# Patient Record
Sex: FEMALE | Race: OTHER | HISPANIC OR LATINO
[De-identification: names, ages, dates, MRNs, and addresses within clinical notes are randomized per-mention and may not be internally consistent; named-entity substitution may affect disease eponyms.]

---

## 2022-11-11 PROBLEM — Z00.00 ENCOUNTER FOR PREVENTIVE HEALTH EXAMINATION: Status: ACTIVE | Noted: 2022-11-11

## 2022-11-18 ENCOUNTER — APPOINTMENT (OUTPATIENT)
Dept: PAIN MANAGEMENT | Facility: CLINIC | Age: 57
End: 2022-11-18

## 2022-11-18 DIAGNOSIS — Z87.39 PERSONAL HISTORY OF OTHER DISEASES OF THE MUSCULOSKELETAL SYSTEM AND CONNECTIVE TISSUE: ICD-10-CM

## 2022-11-18 PROCEDURE — 99204 OFFICE O/P NEW MOD 45 MIN: CPT

## 2022-11-18 RX ORDER — NAPROXEN 500 MG/1
500 TABLET ORAL
Qty: 60 | Refills: 0 | Status: ACTIVE | COMMUNITY
Start: 2022-08-02

## 2022-11-18 RX ORDER — OMEPRAZOLE 20 MG/1
20 CAPSULE, DELAYED RELEASE ORAL
Qty: 90 | Refills: 0 | Status: ACTIVE | COMMUNITY
Start: 2022-10-27

## 2022-11-18 NOTE — DATA REVIEWED
[FreeTextEntry1] : MRI Lumbar Spine:\par L5-S1: grade one spondylolisthesis, severe facet arthropathy, severe bilateral foraminal narrowing, worse on the left, no canal stenosis.

## 2022-11-18 NOTE — PHYSICAL EXAM

## 2022-11-18 NOTE — ASSESSMENT
[FreeTextEntry1] : 56 yof w/ severe low back and bilateral leg pain.\par \par I have personally reviewed the patient's MRI in detail and discussed it with them which is significant for grade one spondylolisthesis at L5-S1 without canal stenosis.\par \par The patient has failed to have relief with medication management. The patient has failed to have relief with more then six weeks of physical therapy within the last three months. Given the patients failure to improve with all other conservative measures, recommend L5-S1 interlaminar epidural steroid injection under fluoroscopic guidance. The patient will follow-up with me in my office two weeks following intervention.\par \par I have discussed in detail with the patient that an interventional spine procedure is associated with potential risks. The procedure may include an injection of steroid and potentially other medications (local anesthetic and normal saline) into the epidural space or surrounding tissue of the spine. There are significant risks of this procedure which include and are not limited to infection, bleeding, worsening pain, dural puncture leading to post-dural puncture headache, nerve damage, spinal cord injury, paralysis, stroke, and death. There is a chance that the procedure does not improve their pain. There are risks associated with the steroid being absorbed into the body systemically. These include dysphoria, difficulty sleeping, mood swings, and personality changes. Pre-menopausal women may notice a regularity his in her menstrual cycle for 2-3 months following the injection. Steroids can specifically affect patients with hypertension, diabetes, and peptic ulcers. The procedure may cause a temporary increase in blood pressure and blood glucose, and may adversely affect a peptic ulcer. Other, more rare complications, including avascular necrosis of the joints, glaucoma, and osteoporosis. I have discussed the risks of the procedure at length with the patient, and the potential benefits of pain relief. I have offered alternatives to the procedure. All questions were answered. The patient expressed understanding and wishes to proceed with the procedure.\par \par Physical therapy prescribed - goal will be to increase ROM, strengthening, postural training, other modalities ad cari which may include massage and stim. Goals of therapy discussed with the patient in detail and will be discussed with physical therapist. Patient will follow-up following course of physical therapy to monitor progress and adjust therapy as needed.\par \par Acetaminophen 1,000 mg q8h prn for moderate pain. Risks, benefits, and alternatives of acetaminophen discussed with patient.\par \par Ibuprofen 600 mg q8h prn add when pain is not adequately controlled with acetaminophen. Risks, benefits, and alternatives of ibuprofen discussed with patient.\par \par Diet and nutritional strategies discussed which may improve patients pain and will improve overall health.\par

## 2022-11-18 NOTE — CONSULT LETTER
[Dear  ___] : Dear  [unfilled], [Consult Letter:] : I had the pleasure of evaluating your patient, [unfilled]. [Please see my note below.] : Please see my note below. [Consult Closing:] : Thank you very much for allowing me to participate in the care of this patient.  If you have any questions, please do not hesitate to contact me. [Sincerely,] : Sincerely, [FreeTextEntry3] : Cortes Cummings MD\par

## 2022-11-18 NOTE — HISTORY OF PRESENT ILLNESS
[Back Pain] : back pain [7] : a current pain level of 7/10 [10] : a maximum pain level of 10/10 [Shooting] : shooting [Electric] : electric [FreeTextEntry1] : 56 yof presents w/ severe low back and bilateral leg pain. Quality of life is impaired. There has been a severe exacerbation of the patient's chronic pain. She has had this pain for years which is worsening. No other recent changes in health.She has seen a spine surgeon who advised surgery previously or injections.

## 2022-11-29 ENCOUNTER — APPOINTMENT (OUTPATIENT)
Dept: PAIN MANAGEMENT | Facility: HOSPITAL | Age: 57
End: 2022-11-29

## 2022-12-20 ENCOUNTER — APPOINTMENT (OUTPATIENT)
Dept: PAIN MANAGEMENT | Facility: HOSPITAL | Age: 57
End: 2022-12-20

## 2022-12-20 ENCOUNTER — TRANSCRIPTION ENCOUNTER (OUTPATIENT)
Age: 57
End: 2022-12-20

## 2023-01-19 ENCOUNTER — APPOINTMENT (OUTPATIENT)
Dept: PAIN MANAGEMENT | Facility: CLINIC | Age: 58
End: 2023-01-19
Payer: MEDICARE

## 2023-01-19 VITALS
BODY MASS INDEX: 29.23 KG/M2 | DIASTOLIC BLOOD PRESSURE: 80 MMHG | HEIGHT: 63 IN | SYSTOLIC BLOOD PRESSURE: 120 MMHG | WEIGHT: 165 LBS

## 2023-01-19 PROCEDURE — 99214 OFFICE O/P EST MOD 30 MIN: CPT

## 2023-01-19 RX ORDER — TERBINAFINE HYDROCHLORIDE 250 MG/1
250 TABLET ORAL
Qty: 30 | Refills: 0 | Status: DISCONTINUED | COMMUNITY
Start: 2022-10-10 | End: 2023-01-19

## 2023-01-19 NOTE — HISTORY OF PRESENT ILLNESS
[Back Pain] : back pain [7] : a current pain level of 7/10 [10] : a maximum pain level of 10/10 [Shooting] : shooting [Electric] : electric [FreeTextEntry1] : Interval History:\par s/p L5-S1 interlaminar epidural steroid on 12/20/22. Improvement in pain. Notes heaviness to right leg and lower back pain. In addition c/o knee and wrist pain. Recently started on Lyrica 75mg and Diclofenac. Feeling much better. Complaining of carpal tunnel syndrome in her right wrist. Quality of life is impaired. There has been a severe exacerbation of the patient's chronic pain.\par \par HPI: 56 yof presents w/ severe low back and bilateral leg pain. Quality of life is impaired. There has been a severe exacerbation of the patient's chronic pain. She has had this pain for years which is worsening. No other recent changes in health.She has seen a spine surgeon who advised surgery previously or injections.\par \par Interventions:\par L5-S1 interlaminar EFFIE (12/20/22):

## 2023-01-19 NOTE — PHYSICAL EXAM

## 2023-01-19 NOTE — ASSESSMENT
[FreeTextEntry1] : 57 yof w/ severe low back and bilateral leg pain w/ improvement s/p EFFIE but still with some pain.\par \par I have personally reviewed the patient's MRI in detail and discussed it with them which is significant for grade one spondylolisthesis at L5-S1 without canal stenosis.\par \par Physical therapy prescribed - goal will be to increase ROM, strengthening, postural training, other modalities ad cari which may include massage and stim. Goals of therapy discussed with the patient in detail and will be discussed with physical therapist. Patient will follow-up following course of physical therapy to monitor progress and adjust therapy as needed.\par \par Acetaminophen 1,000 mg q8h prn for moderate pain. Risks, benefits, and alternatives of acetaminophen discussed with patient.\par \par Ibuprofen 600 mg q8h prn add when pain is not adequately controlled with acetaminophen. Risks, benefits, and alternatives of ibuprofen discussed with patient.\par \par Diet and nutritional strategies discussed which may improve patients pain and will improve overall health.\par \par Consider carpal tunnel injection if pain does not improve.\par

## 2023-02-14 ENCOUNTER — APPOINTMENT (OUTPATIENT)
Dept: PAIN MANAGEMENT | Facility: CLINIC | Age: 58
End: 2023-02-14
Payer: MEDICARE

## 2023-02-14 VITALS
BODY MASS INDEX: 27.29 KG/M2 | WEIGHT: 154 LBS | HEIGHT: 63 IN | DIASTOLIC BLOOD PRESSURE: 80 MMHG | SYSTOLIC BLOOD PRESSURE: 115 MMHG | OXYGEN SATURATION: 95 % | HEART RATE: 69 BPM

## 2023-02-14 PROCEDURE — 99214 OFFICE O/P EST MOD 30 MIN: CPT

## 2023-02-14 RX ORDER — ATORVASTATIN CALCIUM 40 MG/1
40 TABLET, FILM COATED ORAL
Qty: 90 | Refills: 0 | Status: DISCONTINUED | COMMUNITY
Start: 2022-10-27 | End: 2023-02-14

## 2023-02-14 RX ORDER — ICOSAPENT ETHYL 1000 MG/1
1 CAPSULE ORAL
Qty: 360 | Refills: 0 | Status: DISCONTINUED | COMMUNITY
Start: 2022-10-27 | End: 2023-02-14

## 2023-02-14 RX ORDER — AMLODIPINE BESYLATE 10 MG/1
10 TABLET ORAL
Qty: 90 | Refills: 0 | Status: DISCONTINUED | COMMUNITY
Start: 2022-10-27 | End: 2023-02-14

## 2023-02-14 RX ORDER — ALBUTEROL SULFATE 90 UG/1
108 (90 BASE) INHALANT RESPIRATORY (INHALATION)
Qty: 18 | Refills: 0 | Status: DISCONTINUED | COMMUNITY
Start: 2022-11-08 | End: 2023-02-14

## 2023-02-14 RX ORDER — AZITHROMYCIN 500 MG/1
500 TABLET, FILM COATED ORAL
Qty: 5 | Refills: 0 | Status: DISCONTINUED | COMMUNITY
Start: 2022-10-13 | End: 2023-02-14

## 2023-02-14 RX ORDER — BUDESONIDE, GLYCOPYRROLATE, AND FORMOTEROL FUMARATE 160; 9; 4.8 UG/1; UG/1; UG/1
160-9-4.8 AEROSOL, METERED RESPIRATORY (INHALATION)
Qty: 11 | Refills: 0 | Status: DISCONTINUED | COMMUNITY
Start: 2022-11-08 | End: 2023-02-14

## 2023-02-14 RX ORDER — PREGABALIN 75 MG/1
75 CAPSULE ORAL
Refills: 0 | Status: DISCONTINUED | COMMUNITY
End: 2023-02-14

## 2023-02-14 RX ORDER — DICLOFENAC SODIUM 50 MG/1
50 TABLET, DELAYED RELEASE ORAL
Refills: 0 | Status: DISCONTINUED | COMMUNITY
End: 2023-02-14

## 2023-02-14 NOTE — HISTORY OF PRESENT ILLNESS
[Back Pain] : back pain [7] : a current pain level of 7/10 [10] : a maximum pain level of 10/10 [Shooting] : shooting [Electric] : electric [FreeTextEntry1] : Interval History:\par She returns w/ low back and right leg pain. She recently had right carpal tunnel and bilateral knee injections. Quality of life is impaired. There has been a severe exacerbation of the patient's chronic pain. \par \par HPI: 56 yof presents w/ severe low back and bilateral leg pain. Quality of life is impaired. There has been a severe exacerbation of the patient's chronic pain. She has had this pain for years which is worsening. No other recent changes in health.She has seen a spine surgeon who advised surgery previously or injections.\par \par Interventions:\par L5-S1 interlaminar EFFIE (12/20/22):

## 2023-02-14 NOTE — ASSESSMENT
[FreeTextEntry1] : 57 yof w/ severe low back and bilateral leg pain w/ improvement s/p EFFIE but still with some pain.\par \par I have personally reviewed the patient's MRI in detail and discussed it with them which is significant for grade one spondylolisthesis at L5-S1 without canal stenosis.\par \par The patient has failed to have relief with medication management. The patient has failed to have relief with more then six weeks of physical therapy within the last three months. Given the patients failure to improve with all other conservative measures, recommend L5-S1 interlaminar epidural steroid injection under fluoroscopic guidance. The patient will follow-up with me in my office two weeks following intervention.\par \par I have discussed in detail with the patient that an interventional spine procedure is associated with potential risks. The procedure may include an injection of steroid and potentially other medications (local anesthetic and normal saline) into the epidural space or surrounding tissue of the spine. There are significant risks of this procedure which include and are not limited to infection, bleeding, worsening pain, dural puncture leading to post-dural puncture headache, nerve damage, spinal cord injury, paralysis, stroke, and death. There is a chance that the procedure does not improve their pain. There are risks associated with the steroid being absorbed into the body systemically. These include dysphoria, difficulty sleeping, mood swings, and personality changes. Pre-menopausal women may notice a regularity his in her menstrual cycle for 2-3 months following the injection. Steroids can specifically affect patients with hypertension, diabetes, and peptic ulcers. The procedure may cause a temporary increase in blood pressure and blood glucose, and may adversely affect a peptic ulcer. Other, more rare complications, including avascular necrosis of the joints, glaucoma, and osteoporosis. I have discussed the risks of the procedure at length with the patient, and the potential benefits of pain relief. I have offered alternatives to the procedure. All questions were answered. The patient expressed understanding and wishes to proceed with the procedure.\par \par \par Physical therapy prescribed - goal will be to increase ROM, strengthening, postural training, other modalities ad cari which may include massage and stim. Goals of therapy discussed with the patient in detail and will be discussed with physical therapist. Patient will follow-up following course of physical therapy to monitor progress and adjust therapy as needed.\par \par Acetaminophen 1,000 mg q8h prn for moderate pain. Risks, benefits, and alternatives of acetaminophen discussed with patient.\par \par Ibuprofen 600 mg q8h prn add when pain is not adequately controlled with acetaminophen. Risks, benefits, and alternatives of ibuprofen discussed with patient.\par \par Diet and nutritional strategies discussed which may improve patients pain and will improve overall health.\par

## 2023-02-14 NOTE — PHYSICAL EXAM

## 2023-03-30 ENCOUNTER — TRANSCRIPTION ENCOUNTER (OUTPATIENT)
Age: 58
End: 2023-03-30

## 2023-03-30 ENCOUNTER — APPOINTMENT (OUTPATIENT)
Dept: PAIN MANAGEMENT | Facility: HOSPITAL | Age: 58
End: 2023-03-30

## 2023-04-25 ENCOUNTER — APPOINTMENT (OUTPATIENT)
Dept: PAIN MANAGEMENT | Facility: CLINIC | Age: 58
End: 2023-04-25
Payer: MEDICARE

## 2023-04-25 VITALS
WEIGHT: 165 LBS | DIASTOLIC BLOOD PRESSURE: 82 MMHG | BODY MASS INDEX: 29.23 KG/M2 | SYSTOLIC BLOOD PRESSURE: 127 MMHG | HEIGHT: 63 IN | OXYGEN SATURATION: 99 % | HEART RATE: 63 BPM

## 2023-04-25 PROCEDURE — 99214 OFFICE O/P EST MOD 30 MIN: CPT

## 2023-04-25 RX ORDER — METHYLPREDNISOLONE 4 MG/1
4 TABLET ORAL
Qty: 21 | Refills: 0 | Status: DISCONTINUED | COMMUNITY
Start: 2022-10-13 | End: 2023-04-25

## 2023-04-25 NOTE — ASSESSMENT
[FreeTextEntry1] : 57 yof w/ severe low back and bilateral leg pain w/ improvement s/p EFFIE but still with some pain. Worst pain is in the neck now.\par \par I have personally reviewed the patient's MRI in detail and discussed it with them which is significant for grade one spondylolisthesis at L5-S1 without canal stenosis.\par \par Consider MRI of her cervical spine for her worsening pain.\par \par Physical therapy prescribed - goal will be to increase ROM, strengthening, postural training, other modalities ad cari which may include massage and stim. Goals of therapy discussed with the patient in detail and will be discussed with physical therapist. Patient will follow-up following course of physical therapy to monitor progress and adjust therapy as needed.\par \par Acetaminophen 1,000 mg q8h prn for moderate pain. Risks, benefits, and alternatives of acetaminophen discussed with patient.\par \par Ibuprofen 600 mg q8h prn add when pain is not adequately controlled with acetaminophen. Risks, benefits, and alternatives of ibuprofen discussed with patient.\par \par Diet and nutritional strategies discussed which may improve patients pain and will improve overall health.\par

## 2023-04-25 NOTE — HISTORY OF PRESENT ILLNESS
[Back Pain] : back pain [7] : a current pain level of 7/10 [10] : a maximum pain level of 10/10 [Shooting] : shooting [Electric] : electric [FreeTextEntry1] : Interval History:\par She returns w/ low back and right leg pain which has resolved after EFFIE. Now has neck and bilateral shoulder pain.\par \par HPI: 56 yof presents w/ severe low back and bilateral leg pain. Quality of life is impaired. There has been a severe exacerbation of the patient's chronic pain. She has had this pain for years which is worsening. No other recent changes in health.She has seen a spine surgeon who advised surgery previously or injections.\par \par Interventions:\par L5-S1 interlaminar EFFIE (12/20/22):

## 2023-04-25 NOTE — PHYSICAL EXAM

## 2023-11-28 ENCOUNTER — APPOINTMENT (OUTPATIENT)
Dept: PAIN MANAGEMENT | Facility: CLINIC | Age: 58
End: 2023-11-28
Payer: MEDICARE

## 2023-11-28 VITALS
HEART RATE: 72 BPM | BODY MASS INDEX: 28.7 KG/M2 | HEIGHT: 63 IN | WEIGHT: 162 LBS | SYSTOLIC BLOOD PRESSURE: 118 MMHG | DIASTOLIC BLOOD PRESSURE: 82 MMHG | OXYGEN SATURATION: 99 %

## 2023-11-28 PROCEDURE — 99214 OFFICE O/P EST MOD 30 MIN: CPT

## 2023-12-19 ENCOUNTER — TRANSCRIPTION ENCOUNTER (OUTPATIENT)
Age: 58
End: 2023-12-19

## 2023-12-19 ENCOUNTER — APPOINTMENT (OUTPATIENT)
Dept: PAIN MANAGEMENT | Facility: HOSPITAL | Age: 58
End: 2023-12-19

## 2024-01-19 ENCOUNTER — APPOINTMENT (OUTPATIENT)
Dept: PAIN MANAGEMENT | Facility: CLINIC | Age: 59
End: 2024-01-19
Payer: MEDICARE

## 2024-01-19 VITALS
HEIGHT: 63 IN | HEART RATE: 68 BPM | WEIGHT: 162 LBS | DIASTOLIC BLOOD PRESSURE: 75 MMHG | OXYGEN SATURATION: 100 % | SYSTOLIC BLOOD PRESSURE: 115 MMHG | BODY MASS INDEX: 28.7 KG/M2

## 2024-01-19 PROCEDURE — G2211 COMPLEX E/M VISIT ADD ON: CPT

## 2024-01-19 PROCEDURE — 99214 OFFICE O/P EST MOD 30 MIN: CPT

## 2024-01-19 NOTE — ASSESSMENT
[FreeTextEntry1] : 57 yof w/ severe low back and bilateral leg pain w/ improvement s/p EFFIE but still with some pain. Worst pain is in the neck now.  I have personally reviewed the patient's MRI in detail and discussed it with them which is significant for grade one spondylolisthesis at L5-S1 without canal stenosis.  The patient has failed to have relief with over six weeks of physical therapy within the last three months and all medications. GIven their failure to improve with all other conservative measures recommend MRI cervical spine. Patient will return to review imaging and plan for potential intervention.  Physical therapy prescribed - goal will be to increase ROM, strengthening, postural training, other modalities ad cari which may include massage and stim. Goals of therapy discussed with the patient in detail and will be discussed with physical therapist. Patient will follow-up following course of physical therapy to monitor progress and adjust therapy as needed.  Acetaminophen 1,000 mg q8h prn for moderate pain. Risks, benefits, and alternatives of acetaminophen discussed with patient.  Ibuprofen 600 mg q8h prn add when pain is not adequately controlled with acetaminophen. Risks, benefits, and alternatives of ibuprofen discussed with patient.  Diet and nutritional strategies discussed which may improve patients pain and will improve overall health.  Based on the current evaluation and management, I will provide ongoing, longitudinal care for the complex painful condition described above. Patient is encouraged to reach out with any questions and/or concerns regarding their condition and care.

## 2024-01-19 NOTE — PHYSICAL EXAM

## 2024-01-19 NOTE — HISTORY OF PRESENT ILLNESS
[Back Pain] : back pain [7] : a current pain level of 7/10 [10] : a maximum pain level of 10/10 [Shooting] : shooting [Electric] : electric [FreeTextEntry1] : Interval History: She returns w/ low back and right leg pain which has resolved after EFFIE. Now has neck and bilateral shoulder pain. Quality of life is impaired. There has been a severe exacerbation of the patient's chronic pain. Wishes for further intervention.   HPI: 56 yof presents w/ severe low back and bilateral leg pain. Quality of life is impaired. There has been a severe exacerbation of the patient's chronic pain. She has had this pain for years which is worsening. No other recent changes in health.She has seen a spine surgeon who advised surgery previously or injections.  Interventions: L3-L4 interlaminar EFFIE L5-S1 interlaminar EFFIE (12/20/22):

## 2024-03-08 ENCOUNTER — APPOINTMENT (OUTPATIENT)
Dept: PAIN MANAGEMENT | Facility: CLINIC | Age: 59
End: 2024-03-08
Payer: MEDICARE

## 2024-03-08 VITALS
OXYGEN SATURATION: 98 % | HEART RATE: 56 BPM | HEIGHT: 63 IN | DIASTOLIC BLOOD PRESSURE: 75 MMHG | BODY MASS INDEX: 28.7 KG/M2 | WEIGHT: 162 LBS | SYSTOLIC BLOOD PRESSURE: 121 MMHG

## 2024-03-08 PROCEDURE — G2211 COMPLEX E/M VISIT ADD ON: CPT

## 2024-03-08 PROCEDURE — 99214 OFFICE O/P EST MOD 30 MIN: CPT

## 2024-03-08 NOTE — PHYSICAL EXAM

## 2024-03-08 NOTE — HISTORY OF PRESENT ILLNESS
[Back Pain] : back pain [10] : a maximum pain level of 10/10 [7] : a current pain level of 7/10 [Shooting] : shooting [Electric] : electric [FreeTextEntry1] : Interval History: She returns w/ low back and right leg pain which has resolved after EFFIE but has returned. Now has neck and bilateral shoulder pain. Quality of life is impaired. There has been a severe exacerbation of the patient's chronic pain. Wishes for further intervention. Here to review MRI.   HPI: 56 yof presents w/ severe low back and bilateral leg pain. Quality of life is impaired. There has been a severe exacerbation of the patient's chronic pain. She has had this pain for years which is worsening. No other recent changes in health.She has seen a spine surgeon who advised surgery previously or injections.  Interventions: L3-L4 interlaminar EFFIE L5-S1 interlaminar EFFIE (12/20/22):

## 2024-03-08 NOTE — ASSESSMENT
[FreeTextEntry1] : 58 yof w/ severe low back and bilateral leg pain w/ improvement s/p EFFIE but pain is returning.  I have personally reviewed the patient's MRI in detail and discussed it with them which is significant for grade one spondylolisthesis at L5-S1 without canal stenosis.  I have personally reviewed the patient's MRI in detail and discussed it with them which is significant for stenosis in the cervical spine.  The patient has failed to have relief with medication management. The patient has failed to have relief with more then six weeks of physical therapy within the last three months. Given the patients failure to improve with all other conservative measures, recommend L5-S1 interlaminar epidural steroid injection under fluoroscopic guidance. The patient will follow-up with me in my office two weeks following intervention.  I have discussed in detail with the patient that an interventional spine procedure is associated with potential risks. The procedure may include an injection of steroid and potentially other medications (local anesthetic and normal saline) into the epidural space or surrounding tissue of the spine. There are significant risks of this procedure which include and are not limited to infection, bleeding, worsening pain, dural puncture leading to post-dural puncture headache, nerve damage, spinal cord injury, paralysis, stroke, and death. There is a chance that the procedure does not improve their pain. There are risks associated with the steroid being absorbed into the body systemically. These include dysphoria, difficulty sleeping, mood swings, and personality changes. Pre-menopausal women may notice a regularity his in her menstrual cycle for 2-3 months following the injection. Steroids can specifically affect patients with hypertension, diabetes, and peptic ulcers. The procedure may cause a temporary increase in blood pressure and blood glucose, and may adversely affect a peptic ulcer. Other, more rare complications, including avascular necrosis of the joints, glaucoma, and osteoporosis. I have discussed the risks of the procedure at length with the patient, and the potential benefits of pain relief. I have offered alternatives to the procedure. All questions were answered. The patient expressed understanding and wishes to proceed with the procedure.  Physical therapy prescribed - goal will be to increase ROM, strengthening, postural training, other modalities ad cari which may include massage and stim. Goals of therapy discussed with the patient in detail and will be discussed with physical therapist. Patient will follow-up following course of physical therapy to monitor progress and adjust therapy as needed.  Acetaminophen 1,000 mg q8h prn for moderate pain. Risks, benefits, and alternatives of acetaminophen discussed with patient.  Ibuprofen 600 mg q8h prn add when pain is not adequately controlled with acetaminophen. Risks, benefits, and alternatives of ibuprofen discussed with patient.  Diet and nutritional strategies discussed which may improve patients pain and will improve overall health.  Consider cervical EFFIE.   Based on the current evaluation and management, I will provide ongoing, longitudinal care for the complex painful condition described above. Patient is encouraged to reach out with any questions and/or concerns regarding their condition and care.

## 2024-03-08 NOTE — DATA REVIEWED
[FreeTextEntry1] : MRI Lumbar Spine: L5-S1: grade one spondylolisthesis, severe facet arthropathy, severe bilateral foraminal narrowing, worse on the left, no canal stenosis.

## 2024-04-02 ENCOUNTER — TRANSCRIPTION ENCOUNTER (OUTPATIENT)
Age: 59
End: 2024-04-02

## 2024-04-02 ENCOUNTER — APPOINTMENT (OUTPATIENT)
Dept: PAIN MANAGEMENT | Facility: HOSPITAL | Age: 59
End: 2024-04-02

## 2024-04-26 ENCOUNTER — APPOINTMENT (OUTPATIENT)
Dept: PAIN MANAGEMENT | Facility: CLINIC | Age: 59
End: 2024-04-26
Payer: MEDICARE

## 2024-04-26 VITALS
BODY MASS INDEX: 27.64 KG/M2 | HEART RATE: 65 BPM | DIASTOLIC BLOOD PRESSURE: 74 MMHG | SYSTOLIC BLOOD PRESSURE: 113 MMHG | OXYGEN SATURATION: 98 % | HEIGHT: 63 IN | WEIGHT: 156 LBS

## 2024-04-26 DIAGNOSIS — M47.812 SPONDYLOSIS W/OUT MYELOPATHY OR RADICULOPATHY, CERVICAL REGION: ICD-10-CM

## 2024-04-26 DIAGNOSIS — M79.10 MYALGIA, UNSPECIFIED SITE: ICD-10-CM

## 2024-04-26 DIAGNOSIS — M47.817 SPONDYLOSIS W/OUT MYELOPATHY OR RADICULOPATHY, LUMBOSACRAL REGION: ICD-10-CM

## 2024-04-26 DIAGNOSIS — M54.12 RADICULOPATHY, CERVICAL REGION: ICD-10-CM

## 2024-04-26 DIAGNOSIS — M54.16 RADICULOPATHY, LUMBAR REGION: ICD-10-CM

## 2024-04-26 PROCEDURE — G2211 COMPLEX E/M VISIT ADD ON: CPT

## 2024-04-26 PROCEDURE — 99214 OFFICE O/P EST MOD 30 MIN: CPT

## 2024-04-26 NOTE — PHYSICAL EXAM

## 2024-04-26 NOTE — HISTORY OF PRESENT ILLNESS
[Back Pain] : back pain [7] : a current pain level of 7/10 [10] : a maximum pain level of 10/10 [Shooting] : shooting [Electric] : electric [FreeTextEntry1] : Interval History: She returns w/ low back and right leg pain which has resolved after EFFIE. Now has severe neck pain. Quality of life is impaired. There has been a severe exacerbation of the patient's chronic pain. Pain radiates down both arms. Wishes for relief.   HPI: 56 yof presents w/ severe low back and bilateral leg pain. Quality of life is impaired. There has been a severe exacerbation of the patient's chronic pain. She has had this pain for years which is worsening. No other recent changes in health.She has seen a spine surgeon who advised surgery previously or injections.  Interventions: L5-S1 interlaminar EFFIE (04/02/24): L3-L4 interlaminar EFFIE L5-S1 interlaminar EFFIE (12/20/22):

## 2024-04-26 NOTE — DATA REVIEWED
[FreeTextEntry1] : MRI Lumbar Spine: L5-S1: grade one spondylolisthesis, severe facet arthropathy, severe bilateral foraminal narrowing, worse on the left, no canal stenosis.  MRI Cervical spine:  C7-T1: unremarkable T1-T2: disc herniation deforming the right hemicord

## 2024-04-26 NOTE — ASSESSMENT
[FreeTextEntry1] : 58 yof w/ severe low back and bilateral leg pain w/ improvement s/p EFFIE but pain is returning.  I have personally reviewed the patient's MRI in detail and discussed it with them which is significant for grade one spondylolisthesis at L5-S1 without canal stenosis.  I have personally reviewed the patient's MRI in detail and discussed it with them which is significant for stenosis in the cervical spine.  The patient has failed to have relief with medication management. The patient has failed to have relief with more then six weeks of physical therapy within the last three months. Given the patients failure to improve with all other conservative measures, recommend C7-T1 interlaminar epidural steroid injection under fluoroscopic guidance. The patient will follow-up with me in my office two weeks following intervention.  I have discussed in detail with the patient that an interventional spine procedure is associated with potential risks. The procedure may include an injection of steroid and potentially other medications (local anesthetic and normal saline) into the epidural space or surrounding tissue of the spine. There are significant risks of this procedure which include and are not limited to infection, bleeding, worsening pain, dural puncture leading to post-dural puncture headache, nerve damage, spinal cord injury, paralysis, stroke, and death. There is a chance that the procedure does not improve their pain. There are risks associated with the steroid being absorbed into the body systemically. These include dysphoria, difficulty sleeping, mood swings, and personality changes. Pre-menopausal women may notice a regularity his in her menstrual cycle for 2-3 months following the injection. Steroids can specifically affect patients with hypertension, diabetes, and peptic ulcers. The procedure may cause a temporary increase in blood pressure and blood glucose, and may adversely affect a peptic ulcer. Other, more rare complications, including avascular necrosis of the joints, glaucoma, and osteoporosis. I have discussed the risks of the procedure at length with the patient, and the potential benefits of pain relief. I have offered alternatives to the procedure. All questions were answered. The patient expressed understanding and wishes to proceed with the procedure.  Physical therapy prescribed - goal will be to increase ROM, strengthening, postural training, other modalities ad cari which may include massage and stim. Goals of therapy discussed with the patient in detail and will be discussed with physical therapist. Patient will follow-up following course of physical therapy to monitor progress and adjust therapy as needed.  Acetaminophen 1,000 mg q8h prn for moderate pain. Risks, benefits, and alternatives of acetaminophen discussed with patient.  Ibuprofen 600 mg q8h prn add when pain is not adequately controlled with acetaminophen. Risks, benefits, and alternatives of ibuprofen discussed with patient.  Diet and nutritional strategies discussed which may improve patients pain and will improve overall health.  Has seen surgery who advised no surgical intervention.  Based on the current evaluation and management, I will provide ongoing, longitudinal care for the complex painful condition described above. Patient is encouraged to reach out with any questions and/or concerns regarding their condition and care.

## 2024-05-21 ENCOUNTER — TRANSCRIPTION ENCOUNTER (OUTPATIENT)
Age: 59
End: 2024-05-21

## 2024-05-21 ENCOUNTER — APPOINTMENT (OUTPATIENT)
Dept: PAIN MANAGEMENT | Facility: HOSPITAL | Age: 59
End: 2024-05-21

## 2024-07-12 ENCOUNTER — APPOINTMENT (OUTPATIENT)
Dept: PAIN MANAGEMENT | Facility: CLINIC | Age: 59
End: 2024-07-12
Payer: MEDICARE

## 2024-07-12 VITALS
HEIGHT: 63 IN | OXYGEN SATURATION: 100 % | WEIGHT: 147 LBS | HEART RATE: 82 BPM | BODY MASS INDEX: 26.05 KG/M2 | DIASTOLIC BLOOD PRESSURE: 91 MMHG | SYSTOLIC BLOOD PRESSURE: 160 MMHG

## 2024-07-12 DIAGNOSIS — M47.817 SPONDYLOSIS W/OUT MYELOPATHY OR RADICULOPATHY, LUMBOSACRAL REGION: ICD-10-CM

## 2024-07-12 DIAGNOSIS — M54.12 RADICULOPATHY, CERVICAL REGION: ICD-10-CM

## 2024-07-12 DIAGNOSIS — M79.10 MYALGIA, UNSPECIFIED SITE: ICD-10-CM

## 2024-07-12 DIAGNOSIS — M54.16 RADICULOPATHY, LUMBAR REGION: ICD-10-CM

## 2024-07-12 PROCEDURE — 99214 OFFICE O/P EST MOD 30 MIN: CPT

## 2024-07-12 PROCEDURE — G2211 COMPLEX E/M VISIT ADD ON: CPT

## 2024-07-30 ENCOUNTER — APPOINTMENT (OUTPATIENT)
Dept: PAIN MANAGEMENT | Facility: HOSPITAL | Age: 59
End: 2024-07-30

## 2024-07-30 ENCOUNTER — TRANSCRIPTION ENCOUNTER (OUTPATIENT)
Age: 59
End: 2024-07-30

## 2024-08-13 ENCOUNTER — APPOINTMENT (OUTPATIENT)
Dept: PAIN MANAGEMENT | Facility: CLINIC | Age: 59
End: 2024-08-13
Payer: MEDICARE

## 2024-08-13 VITALS
DIASTOLIC BLOOD PRESSURE: 87 MMHG | HEIGHT: 63 IN | OXYGEN SATURATION: 98 % | HEART RATE: 70 BPM | SYSTOLIC BLOOD PRESSURE: 134 MMHG | WEIGHT: 149 LBS | BODY MASS INDEX: 26.4 KG/M2

## 2024-08-13 DIAGNOSIS — M79.10 MYALGIA, UNSPECIFIED SITE: ICD-10-CM

## 2024-08-13 DIAGNOSIS — M47.817 SPONDYLOSIS W/OUT MYELOPATHY OR RADICULOPATHY, LUMBOSACRAL REGION: ICD-10-CM

## 2024-08-13 DIAGNOSIS — M54.16 RADICULOPATHY, LUMBAR REGION: ICD-10-CM

## 2024-08-13 PROCEDURE — G2211 COMPLEX E/M VISIT ADD ON: CPT

## 2024-08-13 PROCEDURE — 99214 OFFICE O/P EST MOD 30 MIN: CPT

## 2024-08-13 NOTE — ASSESSMENT
[FreeTextEntry1] : 58 yof w/ severe low back and bilateral leg pain w/ improvement s/p EFFIE.  I have personally reviewed the patient's MRI in detail and discussed it with them which is significant for grade one spondylolisthesis at L5-S1 without canal stenosis.  I have personally reviewed the patient's MRI in detail and discussed it with them which is significant for stenosis in the cervical spine.  Physical therapy prescribed - goal will be to increase ROM, strengthening, postural training, other modalities ad cari which may include massage and stim. Goals of therapy discussed with the patient in detail and will be discussed with physical therapist. Patient will follow-up following course of physical therapy to monitor progress and adjust therapy as needed.  Acetaminophen 1,000 mg q8h prn for moderate pain. Risks, benefits, and alternatives of acetaminophen discussed with patient.  Ibuprofen 600 mg q8h prn add when pain is not adequately controlled with acetaminophen. Risks, benefits, and alternatives of ibuprofen discussed with patient.  Diet and nutritional strategies discussed which may improve patients pain and will improve overall health.  Has seen surgery who advised no surgical intervention.  Based on the current evaluation and management, I will provide ongoing, longitudinal care for the complex painful condition described above. Patient is encouraged to reach out with any questions and/or concerns regarding their condition and care.

## 2024-08-13 NOTE — PHYSICAL EXAM

## 2024-08-13 NOTE — HISTORY OF PRESENT ILLNESS
[Back Pain] : back pain [7] : a current pain level of 7/10 [10] : a maximum pain level of 10/10 [Shooting] : shooting [Electric] : electric [FreeTextEntry1] : Interval History: She returns w/ low back and right leg pain which has improved after EFFIE. No side effects from procedure, pt is very happy.  HPI: 56 yof presents w/ severe low back and bilateral leg pain. Quality of life is impaired. There has been a severe exacerbation of the patient's chronic pain. She has had this pain for years which is worsening. No other recent changes in health.She has seen a spine surgeon who advised surgery previously or injections.  Interventions: Interlaminar EFFIE L5-S1 interlaminar EFFIE (04/02/24): L3-L4 interlaminar EFFIE L5-S1 interlaminar EFFIE (12/20/22):